# Patient Record
Sex: MALE | Race: BLACK OR AFRICAN AMERICAN | Employment: PART TIME | URBAN - METROPOLITAN AREA
[De-identification: names, ages, dates, MRNs, and addresses within clinical notes are randomized per-mention and may not be internally consistent; named-entity substitution may affect disease eponyms.]

---

## 2020-01-19 ENCOUNTER — HOSPITAL ENCOUNTER (EMERGENCY)
Age: 55
Discharge: HOME | End: 2020-01-20
Attending: EMERGENCY MEDICINE
Payer: COMMERCIAL

## 2020-01-19 DIAGNOSIS — G43.809 OTHER MIGRAINE WITHOUT STATUS MIGRAINOSUS, NOT INTRACTABLE: ICD-10-CM

## 2020-01-19 DIAGNOSIS — G47.411 PRIMARY NARCOLEPSY WITH CATAPLEXY: Primary | ICD-10-CM

## 2020-01-19 LAB
ANION GAP SERPL CALC-SCNC: 3 MMOL/L (ref 3–18)
BASOPHILS # BLD: 0 K/UL (ref 0–0.1)
BASOPHILS NFR BLD: 0 % (ref 0–2)
BUN SERPL-MCNC: 17 MG/DL (ref 7–18)
BUN/CREAT SERPL: 18 (ref 12–20)
CALCIUM SERPL-MCNC: 9 MG/DL (ref 8.5–10.1)
CHLORIDE SERPL-SCNC: 106 MMOL/L (ref 100–111)
CO2 SERPL-SCNC: 31 MMOL/L (ref 21–32)
CREAT SERPL-MCNC: 0.96 MG/DL (ref 0.6–1.3)
DIFFERENTIAL METHOD BLD: NORMAL
EOSINOPHIL # BLD: 0.3 K/UL (ref 0–0.4)
EOSINOPHIL NFR BLD: 4 % (ref 0–5)
ERYTHROCYTE [DISTWIDTH] IN BLOOD BY AUTOMATED COUNT: 14 % (ref 11.6–14.5)
GLUCOSE SERPL-MCNC: 120 MG/DL (ref 74–99)
HCT VFR BLD AUTO: 42.1 % (ref 36–48)
HGB BLD-MCNC: 14.5 G/DL (ref 13–16)
LYMPHOCYTES # BLD: 3.2 K/UL (ref 0.9–3.6)
LYMPHOCYTES NFR BLD: 44 % (ref 21–52)
MAGNESIUM SERPL-MCNC: 2 MG/DL (ref 1.6–2.6)
MCH RBC QN AUTO: 28.5 PG (ref 24–34)
MCHC RBC AUTO-ENTMCNC: 34.4 G/DL (ref 31–37)
MCV RBC AUTO: 82.9 FL (ref 74–97)
MONOCYTES # BLD: 0.5 K/UL (ref 0.05–1.2)
MONOCYTES NFR BLD: 6 % (ref 3–10)
NEUTS SEG # BLD: 3.4 K/UL (ref 1.8–8)
NEUTS SEG NFR BLD: 46 % (ref 40–73)
PLATELET # BLD AUTO: 269 K/UL (ref 135–420)
PMV BLD AUTO: 9.2 FL (ref 9.2–11.8)
POTASSIUM SERPL-SCNC: 3.9 MMOL/L (ref 3.5–5.5)
RBC # BLD AUTO: 5.08 M/UL (ref 4.7–5.5)
SODIUM SERPL-SCNC: 140 MMOL/L (ref 136–145)
WBC # BLD AUTO: 7.4 K/UL (ref 4.6–13.2)

## 2020-01-19 PROCEDURE — 85025 COMPLETE CBC W/AUTO DIFF WBC: CPT

## 2020-01-19 PROCEDURE — 80048 BASIC METABOLIC PNL TOTAL CA: CPT

## 2020-01-19 PROCEDURE — 83735 ASSAY OF MAGNESIUM: CPT

## 2020-01-19 PROCEDURE — 96375 TX/PRO/DX INJ NEW DRUG ADDON: CPT

## 2020-01-19 PROCEDURE — 74011250636 HC RX REV CODE- 250/636: Performed by: EMERGENCY MEDICINE

## 2020-01-19 PROCEDURE — 96374 THER/PROPH/DIAG INJ IV PUSH: CPT

## 2020-01-19 PROCEDURE — 99284 EMERGENCY DEPT VISIT MOD MDM: CPT

## 2020-01-19 RX ORDER — DIPHENHYDRAMINE HYDROCHLORIDE 50 MG/ML
50 INJECTION, SOLUTION INTRAMUSCULAR; INTRAVENOUS ONCE
Status: COMPLETED | OUTPATIENT
Start: 2020-01-19 | End: 2020-01-19

## 2020-01-19 RX ORDER — KETOROLAC TROMETHAMINE 15 MG/ML
15 INJECTION, SOLUTION INTRAMUSCULAR; INTRAVENOUS
Status: COMPLETED | OUTPATIENT
Start: 2020-01-19 | End: 2020-01-19

## 2020-01-19 RX ORDER — PROCHLORPERAZINE EDISYLATE 5 MG/ML
10 INJECTION INTRAMUSCULAR; INTRAVENOUS
Status: COMPLETED | OUTPATIENT
Start: 2020-01-19 | End: 2020-01-19

## 2020-01-19 RX ADMIN — PROCHLORPERAZINE EDISYLATE 10 MG: 5 INJECTION INTRAMUSCULAR; INTRAVENOUS at 22:58

## 2020-01-19 RX ADMIN — SODIUM CHLORIDE 1000 ML: 900 INJECTION, SOLUTION INTRAVENOUS at 23:09

## 2020-01-19 RX ADMIN — KETOROLAC TROMETHAMINE 15 MG: 15 INJECTION, SOLUTION INTRAMUSCULAR; INTRAVENOUS at 22:58

## 2020-01-19 RX ADMIN — DIPHENHYDRAMINE HYDROCHLORIDE 50 MG: 50 INJECTION INTRAMUSCULAR; INTRAVENOUS at 22:58

## 2020-01-20 VITALS
OXYGEN SATURATION: 99 % | RESPIRATION RATE: 15 BRPM | HEART RATE: 71 BPM | SYSTOLIC BLOOD PRESSURE: 129 MMHG | DIASTOLIC BLOOD PRESSURE: 85 MMHG | TEMPERATURE: 98.3 F

## 2020-01-20 NOTE — ED PROVIDER NOTES
Dominic Lagunas is a 48 y.o. male with a history of hypertension, sleep apnea, migraines, narcolepsy with cataplectic spells coming in complaining of weakness. Patient states that he had a spell consistent with his previous narcoleptic spells. He states he was at home shortly before arrival and felt extremely weak and \"frozen. \"  He states that he was still awake and could hear, but was just too weak to move. Denies any falls or trauma. He states that this lasted for a few minutes before resolving. He states now he feels just very fatigued and tired. He states that he is also now having a migraine. He states that he has a constant, achy, pressure-like headache across the front of his head. He reports associated photophobia and phonophobia. He states that he frequently gets migraines after his cataplectic spells. He denies any fevers or chills. Denies any vomiting. He states he is otherwise been feeling well recently, however he has had more of these narcoleptic spells over the last few weeks. He states he sees [facility] and has been following up and he is scheduled for sleep study. No other complaints at this time. No past medical history on file. No past surgical history on file. No family history on file.     Social History     Socioeconomic History    Marital status:      Spouse name: Not on file    Number of children: Not on file    Years of education: Not on file    Highest education level: Not on file   Occupational History    Not on file   Social Needs    Financial resource strain: Not on file    Food insecurity:     Worry: Not on file     Inability: Not on file    Transportation needs:     Medical: Not on file     Non-medical: Not on file   Tobacco Use    Smoking status: Not on file   Substance and Sexual Activity    Alcohol use: Not on file    Drug use: Not on file    Sexual activity: Not on file   Lifestyle    Physical activity:     Days per week: Not on file     Minutes per session: Not on file    Stress: Not on file   Relationships    Social connections:     Talks on phone: Not on file     Gets together: Not on file     Attends Church service: Not on file     Active member of club or organization: Not on file     Attends meetings of clubs or organizations: Not on file     Relationship status: Not on file    Intimate partner violence:     Fear of current or ex partner: Not on file     Emotionally abused: Not on file     Physically abused: Not on file     Forced sexual activity: Not on file   Other Topics Concern    Not on file   Social History Narrative    Not on file         ALLERGIES: Patient has no allergy information on record. Review of Systems   Constitutional: Negative. Negative for chills and fever. Eyes: Positive for photophobia. Negative for visual disturbance. Respiratory: Negative. Negative for shortness of breath. Cardiovascular: Negative. Negative for chest pain. Gastrointestinal: Negative. Negative for abdominal pain, nausea and vomiting. Musculoskeletal: Negative. Negative for myalgias. Skin: Negative. Negative for rash. Neurological: Positive for weakness and headaches. Negative for dizziness, syncope, light-headedness and numbness. All other systems reviewed and are negative. Vitals:    01/19/20 2215 01/19/20 2245 01/19/20 2300   BP: (!) 143/96 126/88 126/86   Pulse:  71    Resp:  15    Temp:  98.3 °F (36.8 °C)    SpO2: 99% 98% 98%            Physical Exam  Vitals signs reviewed. Constitutional:       General: He is not in acute distress. Appearance: Normal appearance. He is well-developed. HENT:      Head: Normocephalic and atraumatic. Eyes:      Extraocular Movements: Extraocular movements intact. Pupils: Pupils are equal, round, and reactive to light. Comments: Mild photophobia on exam   Neck:      Musculoskeletal: Normal range of motion and neck supple.       Comments: No rigidity, full range of motion. No meningeal signs. Cardiovascular:      Rate and Rhythm: Normal rate and regular rhythm. Heart sounds: S1 normal and S2 normal.   Pulmonary:      Effort: Pulmonary effort is normal. No accessory muscle usage or respiratory distress. Abdominal:      General: There is no distension. Palpations: Abdomen is not rigid. Tenderness: There is no tenderness. Musculoskeletal: Normal range of motion. General: No tenderness. Skin:     General: Skin is warm. Findings: No rash. Neurological:      General: No focal deficit present. Mental Status: He is alert and oriented to person, place, and time. Sensory: No sensory deficit. Motor: No weakness. Coordination: Coordination normal.      Comments: No current muscular weakness. Patient moving all extremities. No focal deficits. Psychiatric:         Speech: Speech normal.          MDM  Number of Diagnoses or Management Options  Other migraine without status migrainosus, not intractable:   Primary narcolepsy with cataplexy:   Diagnosis management comments: Maira Moody is a 48 y.o. male with a history of narcolepsy coming in after some type of narcoleptic spell. Symptoms have since resolved. No focal deficits or weakness. Patient did not syncopize or lose consciousness. He states he was awake and could hear everything going on throughout this entire spell. Patient now having a migraine, consistent with previous spells. No focal deficits or concern for subarachnoid hemorrhage or intracranial infection. Will check basic labs including potassium. We will treat patient's migraine symptomatically. Do not feel he needs advanced brain imaging at this time as this is consistent with his previous spells. Will advise to follow-up with his PCP as an outpatient for further testing and work-up. Patient feeling much better after the typical migraine cocktail here.   Is resting comfortably in bed.  Easily arousable and answers questions appropriately. Advised him to follow-up with his regular doctor and gave return precautions for worsening symptoms. Procedures        Vitals:  Patient Vitals for the past 12 hrs:   Temp Pulse Resp BP SpO2   01/19/20 2300 -- -- -- 126/86 98 %   01/19/20 2245 98.3 °F (36.8 °C) 71 15 126/88 98 %   01/19/20 2215 -- -- -- (!) 143/96 99 %       Medications ordered:   Medications   sodium chloride 0.9 % bolus infusion 1,000 mL (1,000 mL IntraVENous New Bag 1/19/20 2309)   prochlorperazine (COMPAZINE) injection 10 mg (10 mg IntraVENous Given 1/19/20 2258)   ketorolac (TORADOL) injection 15 mg (15 mg IntraVENous Given 1/19/20 2258)   diphenhydrAMINE (BENADRYL) injection 50 mg (50 mg IntraVENous Given 1/19/20 2258)         Lab findings:  Recent Results (from the past 12 hour(s))   CBC WITH AUTOMATED DIFF    Collection Time: 01/19/20 10:39 PM   Result Value Ref Range    WBC 7.4 4.6 - 13.2 K/uL    RBC 5.08 4.70 - 5.50 M/uL    HGB 14.5 13.0 - 16.0 g/dL    HCT 42.1 36.0 - 48.0 %    MCV 82.9 74.0 - 97.0 FL    MCH 28.5 24.0 - 34.0 PG    MCHC 34.4 31.0 - 37.0 g/dL    RDW 14.0 11.6 - 14.5 %    PLATELET 067 954 - 031 K/uL    MPV 9.2 9.2 - 11.8 FL    NEUTROPHILS 46 40 - 73 %    LYMPHOCYTES 44 21 - 52 %    MONOCYTES 6 3 - 10 %    EOSINOPHILS 4 0 - 5 %    BASOPHILS 0 0 - 2 %    ABS. NEUTROPHILS 3.4 1.8 - 8.0 K/UL    ABS. LYMPHOCYTES 3.2 0.9 - 3.6 K/UL    ABS. MONOCYTES 0.5 0.05 - 1.2 K/UL    ABS. EOSINOPHILS 0.3 0.0 - 0.4 K/UL    ABS.  BASOPHILS 0.0 0.0 - 0.1 K/UL    DF AUTOMATED     METABOLIC PANEL, BASIC    Collection Time: 01/19/20 10:39 PM   Result Value Ref Range    Sodium 140 136 - 145 mmol/L    Potassium 3.9 3.5 - 5.5 mmol/L    Chloride 106 100 - 111 mmol/L    CO2 31 21 - 32 mmol/L    Anion gap 3 3.0 - 18 mmol/L    Glucose 120 (H) 74 - 99 mg/dL    BUN 17 7.0 - 18 MG/DL    Creatinine 0.96 0.6 - 1.3 MG/DL    BUN/Creatinine ratio 18 12 - 20      GFR est AA >60 >60 ml/min/1.73m2 GFR est non-AA >60 >60 ml/min/1.73m2    Calcium 9.0 8.5 - 10.1 MG/DL   MAGNESIUM    Collection Time: 01/19/20 10:39 PM   Result Value Ref Range    Magnesium 2.0 1.6 - 2.6 mg/dL       Disposition:  Diagnosis:   1. Primary narcolepsy with cataplexy    2.  Other migraine without status migrainosus, not intractable        Disposition: Discharge    Follow-up Information     Follow up With Specialties Details Why Contact Info    Melinda Locke MD [facility] Schedule an appointment as soon as possible for a visit for office follow up [address]ron Lee [address]      [facility] EMERGENCY DEPT Emergency Medicine  As needed, If symptoms worsen [address] [place] [address] [id number] [phone]           Patient's Medications    No medications on file

## 2020-01-20 NOTE — DISCHARGE INSTRUCTIONS
Patient Education        Narcolepsy: Care Instructions  Your Care Instructions  Everybody gets a little sleepy once in a while, during a long car ride or other times when you want to be alert. But some people cannot control their sleepiness. It is no fun to be in the middle of your workday or driving your car down the street and have an overwhelming desire to sleep. This condition is called narcolepsy. Doctors do not know what causes narcolepsy. Your doctor may ask you to keep a sleep diary for a couple of weeks. It will help you and your doctor decide on treatment. It often helps to take limited naps during the day. And these things might help you sleep better at night: create a good place to sleep, do things that help your mood before you go to bed, and keep a consistent sleep schedule. Your doctor may recommend medicine to help you stay awake during the day or sleep at night. Follow-up care is a key part of your treatment and safety. Be sure to make and go to all appointments, and call your doctor if you are having problems. It's also a good idea to know your test results and keep a list of the medicines you take. How can you care for yourself at home? · Try to take 2 or 3 short naps at regular times during the day. After a nap, always give yourself time to become alert before you drive a car or do anything that might cause an accident. · Take your medicines exactly as prescribed. Call your doctor if you think you are having a problem with your medicine. You may need to try several medicines before you find the one that works best for you. · Try to improve your nighttime sleep habits. Here are a few of the things you could do:  ? Go to bed only when you are sleepy, and get up at the same time every day, even if you do not feel rested.  This might help you sleep well the next night and the night after that.  ? If you lie awake for longer than 15 minutes, get up, leave the bedroom, and do something quiet, such as read, until you feel sleepy again. ? Avoid drinking or eating anything with caffeine after 3 p.m. This includes coffee, tea, cola drinks, and chocolate. ? Make sure your bedroom is not too hot or too cold, and keep it quiet and dark. ? Make sure your mattress provides good support. · Be kind to your body:  ? Relieve tension with exercise or a massage. ? Learn and do relaxation techniques. ? Avoid alcohol, caffeine, nicotine, and illegal drugs. They can increase your anxiety level and cause sleep problems. · Get light exercise daily. Gentle stretching, light aerobics, swimming, walking, and riding a bicycle can help to keep you going during the day and to sleep well at night. · Eat a healthy diet. You may feel better if you avoid heavy meals and eat more fruits and vegetables. · Do not use over-the-counter sleeping pills. They can make your sleep restless. · Ask your doctor if any medicines you take could cause sleepiness. For example, cold and allergy medicines can make you drowsy. · Consider joining a support group with people who have narcolepsy or other sleep problems. These groups can be a good source of tips for what to do. Also, it can be comforting to talk to people who face similar challenges. Your doctor can tell you how to contact a support group. When should you call for help? Call your doctor now or seek immediate medical care if:    · You passed out (lost consciousness).     · You cannot use your muscles. This may happen very briefly, sometimes after you laugh or are angry, and may only affect part of your body.    Watch closely for changes in your health, and be sure to contact your doctor if:    · Your sleepiness continues to get worse. Where can you learn more? Go to http://kezia-olivia.info/. Enter E410 in the search box to learn more about \"Narcolepsy: Care Instructions. \"  Current as of: June 9, 2019  Content Version: 12.2  © 8195-1301 [employer]. Care instructions adapted under license by [employer] (which disclaims liability or warranty for this information). If you have questions about a medical condition or this instruction, always ask your healthcare professional. [address] any warranty or liability for your use of this information. Patient Education        Migraine Headache: Care Instructions  Your Care Instructions  Migraines are painful, throbbing headaches that often start on one side of the head. They may cause nausea and vomiting and make you sensitive to light, sound, or smell. Without treatment, migraines can last from 4 hours to a few days. Medicines can help prevent migraines or stop them after they have started. Your doctor can help you find which ones work best for you. Follow-up care is a key part of your treatment and safety. Be sure to make and go to all appointments, and call your doctor if you are having problems. It's also a good idea to know your test results and keep a list of the medicines you take. How can you care for yourself at home? · Do not drive if you have taken a prescription pain medicine. · Rest in a quiet, dark room until your headache is gone. Close your eyes, and try to relax or go to sleep. Don't watch TV or read. · Put a cold, moist cloth or cold pack on the painful area for 10 to 20 minutes at a time. Put a thin cloth between the cold pack and your skin. · Use a warm, moist towel or a heating pad set on low to relax tight shoulder and neck muscles. · Have someone gently massage your neck and shoulders. · Take your medicines exactly as prescribed. Call your doctor if you think you are having a problem with your medicine. You will get more details on the specific medicines your doctor prescribes. · Be careful not to take pain medicine more often than the instructions allow. You could get worse or more frequent headaches when the medicine wears off.   To prevent migraines  · Keep a headache diary so you can figure out what triggers your headaches. Avoiding triggers may help you prevent headaches. Record when each headache began, how long it lasted, and what the pain was like. (Was it throbbing, aching, stabbing, or dull?) Write down any other symptoms you had with the headache, such as nausea, flashing lights or dark spots, or sensitivity to bright light or loud noise. Note if the headache occurred near your period. List anything that might have triggered the headache. Triggers may include certain foods (chocolate, cheese, wine) or odors, smoke, bright light, stress, or lack of sleep. · If your doctor has prescribed medicine for your migraines, take it as directed. You may have medicine that you take only when you get a migraine and medicine that you take all the time to help prevent migraines. ? If your doctor has prescribed medicine for when you get a headache, take it at the first sign of a migraine, unless your doctor has given you other instructions. ? If your doctor has prescribed medicine to prevent migraines, take it exactly as prescribed. Call your doctor if you think you are having a problem with your medicine. · Find healthy ways to deal with stress. Migraines are most common during or right after stressful times. Take time to relax before and after you do something that has caused a migraine in the past.  · Try to keep your muscles relaxed by keeping good posture. Check your jaw, face, neck, and shoulder muscles for tension. Try to relax them. When you sit at a desk, change positions often. And make sure to stretch for 30 seconds each hour. · Get plenty of sleep and exercise. · Eat meals on a regular schedule. Avoid foods and drinks that often trigger migraines.  These include chocolate, alcohol (especially red wine and port), aspartame, monosodium glutamate (MSG), and some additives found in foods (such as hot dogs, bray, cold cuts, aged cheeses, and pickled foods). · Limit caffeine. Don't drink too much coffee, tea, or soda. But don't quit caffeine suddenly. That can also give you migraines. · Do not smoke or allow others to smoke around you. If you need help quitting, talk to your doctor about stop-smoking programs and medicines. These can increase your chances of quitting for good. · If you are taking birth control pills or hormone therapy, talk to your doctor about whether they are triggering your migraines. When should you call for help? Call 911 anytime you think you may need emergency care. For example, call if:    · You have signs of a stroke. These may include:  ? Sudden numbness, paralysis, or weakness in your face, arm, or leg, especially on only one side of your body. ? Sudden vision changes. ? Sudden trouble speaking. ? Sudden confusion or trouble understanding simple statements. ? Sudden problems with walking or balance. ? A sudden, severe headache that is different from past headaches.    Call your doctor now or seek immediate medical care if:    · You have new or worse nausea and vomiting.     · You have a new or higher fever.     · Your headache gets much worse.    Watch closely for changes in your health, and be sure to contact your doctor if:    · You are not getting better after 2 days (48 hours). Where can you learn more? Go to http://kezia-olivia.info/. Enter O265 in the search box to learn more about \"Migraine Headache: Care Instructions. \"  Current as of: March 28, 2019  Content Version: 12.2  © 6940-5902 [employer]. Care instructions adapted under license by [employer] (which disclaims liability or warranty for this information). If you have questions about a medical condition or this instruction, always ask your healthcare professional. [address] any warranty or liability for your use of this information.

## 2020-01-20 NOTE — ED TRIAGE NOTES
Patient presents to the ED with complaints of cataplexy episode with a migraine. Patient states he has a history of narcolepsy. Patient denies any chest pain, nausea, diarrhea, vomiting. Patient states he just feels tired and has a migraine.

## 2020-05-16 ENCOUNTER — HOSPITAL ENCOUNTER (EMERGENCY)
Age: 55
Discharge: HOME | End: 2020-05-16
Attending: EMERGENCY MEDICINE
Payer: OTHER GOVERNMENT

## 2020-05-16 VITALS
DIASTOLIC BLOOD PRESSURE: 93 MMHG | WEIGHT: 275 LBS | SYSTOLIC BLOOD PRESSURE: 141 MMHG | RESPIRATION RATE: 18 BRPM | OXYGEN SATURATION: 100 % | HEIGHT: 72 IN | TEMPERATURE: 98.3 F | BODY MASS INDEX: 37.25 KG/M2 | HEART RATE: 62 BPM

## 2020-05-16 DIAGNOSIS — J06.9 ACUTE URI: Primary | ICD-10-CM

## 2020-05-16 LAB — DEPRECATED S PYO AG THROAT QL EIA: NEGATIVE

## 2020-05-16 PROCEDURE — 87070 CULTURE OTHR SPECIMN AEROBIC: CPT

## 2020-05-16 PROCEDURE — 99282 EMERGENCY DEPT VISIT SF MDM: CPT

## 2020-05-16 PROCEDURE — 87880 STREP A ASSAY W/OPTIC: CPT

## 2020-05-16 PROCEDURE — 87147 CULTURE TYPE IMMUNOLOGIC: CPT

## 2020-05-16 RX ORDER — BUPROPION HYDROCHLORIDE 100 MG/1
100 TABLET, EXTENDED RELEASE ORAL DAILY
COMMUNITY

## 2020-05-16 RX ORDER — ALBUTEROL SULFATE 90 UG/1
AEROSOL, METERED RESPIRATORY (INHALATION)
COMMUNITY

## 2020-05-16 RX ORDER — DULOXETIN HYDROCHLORIDE 30 MG/1
30 CAPSULE, DELAYED RELEASE ORAL 2 TIMES DAILY
COMMUNITY

## 2020-05-16 RX ORDER — METFORMIN HYDROCHLORIDE 1000 MG/1
1000 TABLET ORAL 2 TIMES DAILY WITH MEALS
COMMUNITY

## 2020-05-16 NOTE — ED PROVIDER NOTES
HPI   Patient complains of stuffy nose and sinus congestion for the past 4 to 5 days. He is been taking over-the-counter nasal decongestants. He complains of a 24-hour history of a scratchy throat and sore throat. He said yesterday his throat was feeling scratchy and today it is hurting more specially hurts to swallow. He saw a physician at a local facility earlier this morning and they diagnosed him with an upper respiratory infection and told him to follow-up if symptoms worsen. He says his throat feels worse now and this prompted him to come the emergency room today. He denies any fever chills. Denies any chest pain or abdominal pain. No other complaints given at this time. Past Medical History:   Diagnosis Date    [facility] ([facility] [address]     Bronchitis, chronic (HCC)     Diabetes ([facility] [address]        Past Surgical History:   Procedure Laterality Date    HX HEMORRHOIDECTOMY           History reviewed. No pertinent family history. Social History     Socioeconomic History    Marital status:      Spouse name: Not on file    Number of children: Not on file    Years of education: Not on file    Highest education level: Not on file   Occupational History    Not on file   Social Needs    Financial resource strain: Not on file    Food insecurity     Worry: Not on file     Inability: Not on file    Transportation needs     Medical: Not on file     Non-medical: Not on file   Tobacco Use    Smoking status: Never Smoker    Smokeless tobacco: Never Used   Substance and Sexual Activity    Alcohol use:  Yes    Drug use: Not Currently    Sexual activity: Not on file   Lifestyle    Physical activity     Days per week: Not on file     Minutes per session: Not on file    Stress: Not on file   Relationships    Social connections     Talks on phone: Not on file     Gets together: Not on file     Attends Sabianist service: Not on file     Active member of club or organization: Not on file     Attends meetings of clubs or organizations: Not on file     Relationship status: Not on file    Intimate partner violence     Fear of current or ex partner: Not on file     Emotionally abused: Not on file     Physically abused: Not on file     Forced sexual activity: Not on file   Other Topics Concern    Not on file   Social History Narrative    Not on file         ALLERGIES: Patient has no known allergies. Review of Systems   Constitutional: Negative. HENT: Positive for congestion, rhinorrhea and sore throat. Eyes: Negative. Respiratory: Negative. Cardiovascular: Negative. Gastrointestinal: Negative. Genitourinary: Negative. Musculoskeletal: Negative. Neurological: Negative. Psychiatric/Behavioral: Negative. Vitals:    05/16/20 1149   BP: (!) 141/93   Pulse: 62   Resp: 18   Temp: 98.3 °F (36.8 °C)   SpO2: 100%   Weight: 124.7 kg (275 lb)   Height: 6' (1.829 m)            Physical Exam  Vitals signs and nursing note reviewed. Constitutional:       Appearance: He is well-developed. HENT:      Head: Normocephalic and atraumatic. Eyes:      Pupils: Pupils are equal, round, and reactive to light. Neck:      Musculoskeletal: Neck supple. Cardiovascular:      Rate and Rhythm: Normal rate and regular rhythm. Pulmonary:      Effort: Pulmonary effort is normal.      Breath sounds: Normal breath sounds. Abdominal:      Palpations: Abdomen is soft. Musculoskeletal: Normal range of motion. Skin:     General: Skin is warm and dry. Neurological:      Mental Status: He is alert and oriented to person, place, and time. [facility]         Procedures        Reviewed the results of the ER evaluation and his throat test with the patient. He understands and agrees with the disposition follow-up plan.   Cristina Rice MD 12:36 PM

## 2020-05-16 NOTE — DISCHARGE INSTRUCTIONS
Patient Education        Upper Respiratory Infection (Cold): Care Instructions  Your Care Instructions    An upper respiratory infection, or URI, is an infection of the nose, sinuses, or throat. URIs are spread by coughs, sneezes, and direct contact. The common cold is the most frequent kind of URI. The flu and sinus infections are other kinds of URIs. Almost all URIs are caused by viruses. Antibiotics won't cure them. But you can treat most infections with home care. This may include drinking lots of fluids and taking over-the-counter pain medicine. You will probably feel better in 4 to 10 days. The doctor has checked you carefully, but problems can develop later. If you notice any problems or new symptoms, get medical treatment right away. Follow-up care is a key part of your treatment and safety. Be sure to make and go to all appointments, and call your doctor if you are having problems. It's also a good idea to know your test results and keep a list of the medicines you take. How can you care for yourself at home? · To prevent dehydration, drink plenty of fluids, enough so that your urine is light yellow or clear like water. Choose water and other caffeine-free clear liquids until you feel better. If you have kidney, heart, or liver disease and have to limit fluids, talk with your doctor before you increase the amount of fluids you drink. · Take an over-the-counter pain medicine, such as acetaminophen (Tylenol), ibuprofen (Advil, Motrin), or naproxen (Aleve). Read and follow all instructions on the label. · Before you use cough and cold medicines, check the label. These medicines may not be safe for young children or for people with certain health problems. · Be careful when taking over-the-counter cold or flu medicines and Tylenol at the same time. Many of these medicines have acetaminophen, which is Tylenol. Read the labels to make sure that you are not taking more than the recommended dose.  Too much acetaminophen (Tylenol) can be harmful. · Get plenty of rest.  · Do not smoke or allow others to smoke around you. If you need help quitting, talk to your doctor about stop-smoking programs and medicines. These can increase your chances of quitting for good. When should you call for help? Call 911 anytime you think you may need emergency care. For example, call if:    · You have severe trouble breathing.    Call your doctor now or seek immediate medical care if:    · You seem to be getting much sicker.     · You have new or worse trouble breathing.     · You have a new or higher fever.     · You have a new rash.    Watch closely for changes in your health, and be sure to contact your doctor if:    · You have a new symptom, such as a sore throat, an earache, or sinus pain.     · You cough more deeply or more often, especially if you notice more mucus or a change in the color of your mucus.     · You do not get better as expected. Where can you learn more? Go to http://kezia-olivia.info/  Enter K520 in the search box to learn more about \"Upper Respiratory Infection (Cold): Care Instructions. \"  Current as of: June 9, 2019Content Version: 12.4  © 3773-0713 [employer]orporated. Care instructions adapted under license by [employer] (which disclaims liability or warranty for this information). If you have questions about a medical condition or this instruction, always ask your healthcare professional. [address] any warranty or liability for your use of this information.

## 2020-05-16 NOTE — ED TRIAGE NOTES
Pt states onset of dry cough this week. Awoke during the night last night with sore throat. Went to ED at [facility] [place] @ 3am and was advised he has post nasal drip and to continue to take Zyrtec as he has been. States today he feels worse and has been nauseated. Denies vomiting.

## 2020-05-20 LAB
BACTERIA SPEC CULT: ABNORMAL
BACTERIA SPEC CULT: ABNORMAL
SERVICE CMNT-IMP: ABNORMAL

## 2022-09-20 ENCOUNTER — HOSPITAL ENCOUNTER (OUTPATIENT)
Dept: PHYSICAL THERAPY | Age: 57
Discharge: HOME | End: 2022-09-20
Payer: OTHER GOVERNMENT

## 2022-09-20 PROCEDURE — 97110 THERAPEUTIC EXERCISES: CPT

## 2022-09-20 PROCEDURE — 97162 PT EVAL MOD COMPLEX 30 MIN: CPT

## 2022-09-20 PROCEDURE — 97112 NEUROMUSCULAR REEDUCATION: CPT

## 2022-09-20 PROCEDURE — 97535 SELF CARE MNGMENT TRAINING: CPT

## 2022-09-20 NOTE — PROGRESS NOTES
PT DAILY TREATMENT NOTE     Patient Name: Oralia Thompson  Date:2022  : 1965  [x]  Patient  Verified  Payor: Marguerite Allen / Plan: [facility] / Product Type: Federal Funded Programs /    In [employer] time:1245pm  Total Treatment Time (min): 40  Visit #: 1 of 8     Treatment Area: Other low back pain [M54.59]    SUBJECTIVE  Pain Level (0-10 scale): 6  Any medication changes, allergies to medications, adverse drug reactions, diagnosis change, or new procedure performed?: [x] No    [] Yes (see summary sheet for update)  Subjective functional status/changes:   [] No changes reported  See eval    OBJECTIVE     16 min [x]Eval                  []Re-Eval       8 min Therapeutic Exercise:  [] See flow sheet : HEP education   Rationale: increase ROM and increase strength to improve the patients ability to manage ADLs with reduced pain. 8 min Neuromuscular Re-education:  []  See flow sheet : Pt education and performed of self SIJ correction MET in sitting followed by explanation of performance in supine with picture. Rationale: improve coordination and increase proprioception  to improve the patients ability to manage ambulatory ADLs with improved stability and reduced pain. 8 min Self Care/Home Management: Pt education regarding relevant anatomy and pathology as it relates to self care. Rationale: increase ROM and reduce pain   to improve the patients ability to manage self care.           With   [] TE   [] TA   [] neuro   [] other: Patient Education: [x] Review HEP    [] Progressed/Changed HEP based on:   [] positioning   [] body mechanics   [] transfers   [] heat/ice application    [] other:      Other Objective/Functional Measures: see eval     Pain Level (0-10 scale) post treatment: 6    ASSESSMENT/Changes in Function: see POC    Patient will continue to benefit from skilled PT services to modify and progress therapeutic interventions, address functional mobility deficits, address ROM deficits, address strength deficits, analyze and address soft tissue restrictions, analyze and cue movement patterns, analyze and modify body mechanics/ergonomics, assess and modify postural abnormalities, address imbalance/dizziness, and instruct in home and community integration to attain remaining goals. [x]  See Plan of Care  []  See progress note/recertification  []  See Discharge Summary         Progress towards goals / Updated goals:  Short Term Goals: To be accomplished in 2 weeks:  Pt will report daily HEP compliance to optimize therapeutic outcomes. Eval: HEP assigned  Pt will report pain < or = 3/10 to demonstrate improved self management of pain. Eval: 6/10 today  Long Term Goals: To be accomplished in 4 weeks:  Pt will improve his FOTO to 57, demonstrating improved functional ADL capacity. Eval: 48  Pt will demonstrate WNL pelvic alignment to reduce pain with exercise. Eval: left posterior innominate  Pt will improve his B gluteus sae strength to 4+/5 MMT to improve hip stability with weight lifting. Eval: 4-/5  Pt will improve his B gluteus medius strength to 4+/5 MMT to reduce pain with ambulation. Eval: 4-/5  Pt will demonstrate squat and dead lift with proper form to prepare for return to lifting at a gym.    Eval: NT    PLAN  []  Upgrade activities as tolerated     [x]  Continue plan of care  []  Update interventions per flow sheet       []  Discharge due to:_  []  Other:_      Jose Breath, PT 9/20/2022  2:09 PM    Future Appointments   Date Time Provider Heike Calvert   9/22/2022 12:00 PM Leah Marion, PT [facility]   9/27/2022  1:30 PM Laban Bombard, PTA [facility]   9/30/2022 12:45 PM Laban Bombard, PTA [facility]   10/4/2022 12:45 PM Laban Bombard, PTA [facility]   10/6/2022 12:00 PM Leah Mraion, PT [facility]   10/11/2022  1:30 PM Chula Girt, PT [facility]   10/13/2022  1:45 PM Chula Girt, PT [facility]

## 2022-09-20 NOTE — PROGRESS NOTES
[facility]  [address] [facility] [address]  [place], [address]  [phone] fax    Plan of Care/ Statement of Necessity for Physical Therapy Services    Patient name: Leyla Castillo Start of Care: 2022   Referral source: Deniz Bass* : 1965    Medical Diagnosis: Other low back pain [M54.59]  Payor: [facility] CCN / Plan: [id number] / Product Type: Federal Funded Programs /  Onset [id number]    Treatment Diagnosis: LBP, left hip pain   Prior Hospitalization: see medical history Provider#: [id number]   Medications: Verified on Patient summary List    Comorbidities: asthma, back pain, BMI > 30, depression, diabetes, HA, high blood pressure, sleep dysfunction, chronic B knee pains   Prior Level of Function: prior to the last year has been managing pain with NSAIDS and oral meds. The Plan of Care and following information is based on the information from the initial evaluation. Assessment/ key information: Pt is a 57-year-old man presenting to the clinic with hx of LBP left > right beginning in 2018 following slipping on ice and falling onto his buttocks. Pain has worsened over the last year in his low back and into his left posterolateral hip. He is limited to < 30 minutes with walking due to pain and also experiences increased pain with prolonged sitting. He has been unable to lift weights, ride his bike, or exercise without pain increase. Denies any numbness, tingling, burning. Demonstrates (+) left innominate posterior rotation, (+) left NOEL, tight left QL, and TTP of L1-5. Impairments include reduced quadriceps and piri flexibility, decreased B hip IR, decreased B hip strength, decreased abdominal strength, and pain. 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 Pt will benefit from skilled PT services to address the aforementioned impairments and improve ease of ADLs.      Evaluation Complexity History MEDIUM  Complexity : 1-2 comorbidities / personal factors will impact the outcome/ POC ; Examination MEDIUM Complexity : 3 Standardized tests and measures addressing body structure, function, activity limitation and / or participation in recreation  ;Presentation MEDIUM Complexity : Evolving with changing characteristics  ; Clinical Decision Making MEDIUM Complexity : FOTO score of 26-74  Overall Complexity Rating: MEDIUM  Problem List: pain affecting function, decrease ROM, decrease strength, impaired gait/ balance, decrease ADL/ functional abilitiies, decrease activity tolerance, decrease flexibility/ joint mobility, and decrease transfer abilities   Treatment Plan may include any combination of the following: Therapeutic exercise, Therapeutic activities, Neuromuscular re-education, Physical agent/modality, Gait/balance training, Manual therapy, Patient education, Self Care training, Functional mobility training, Home safety training, and Stair training  Patient / Family readiness to learn indicated by: asking questions, trying to perform skills, and interest  Persons(s) to be included in education: patient (P)  Barriers to Learning/Limitations: None  Patient Goal (s): dissipate the pain to be active, I.e. exercise  Patient Self Reported Health Status: poor  Rehabilitation Potential: good    Short Term Goals: To be accomplished in 2 weeks:  Pt will report daily HEP compliance to optimize therapeutic outcomes. Pt will report pain < or = 3/10 to demonstrate improved self management of pain. Long Term Goals: To be accomplished in 4 weeks:  Pt will improve his FOTO to 57, demonstrating improved functional ADL capacity. Pt will demonstrate WNL pelvic alignment to reduce pain with exercise. Pt will improve his B gluteus sae strength to 4+/5 MMT to improve hip stability with weight lifting. Pt will improve his B gluteus medius strength to 4+/5 MMT to reduce pain with ambulation.   Pt will demonstrate squat and dead lift with proper form to prepare for return to lifting at a gym. Frequency / Duration: Patient to be seen 2 times per week for 4 weeks. Patient/ Caregiver education and instruction: Diagnosis, prognosis, self care, activity modification, and exercises   [x]  Plan of care has been reviewed with JUNIOR Bass, PT 9/20/2022 1:45 PM    ________________________________________________________________________    I certify that the above Therapy Services are being furnished while the patient is under my care. I agree with the treatment plan and certify that this therapy is necessary.     [de-identified] Signature:____________Date:_________TIME:________     Cassidy Walden*  ** Signature, Date and Time must be completed for valid certification **    Please sign and return to [facility] [address] [place] [address] & [facility]  [address]  [place], [address]  [phone] fax

## 2022-09-22 ENCOUNTER — HOSPITAL ENCOUNTER (OUTPATIENT)
Dept: PHYSICAL THERAPY | Age: 57
Discharge: HOME | End: 2022-09-22
Payer: OTHER GOVERNMENT

## 2022-09-22 PROCEDURE — 97110 THERAPEUTIC EXERCISES: CPT

## 2022-09-22 PROCEDURE — 97140 MANUAL THERAPY 1/> REGIONS: CPT

## 2022-09-22 PROCEDURE — 97112 NEUROMUSCULAR REEDUCATION: CPT

## 2022-09-22 NOTE — PROGRESS NOTES
PT DAILY TREATMENT NOTE 10-18    Patient Name: Jaime Domingo  Date:2022  : 1965  [x]  Patient  Verified  Payor: [facility] CCN / Plan: [facility] CCN / Product Type: Federal Funded Programs /    In [employer] time:1246  Total Treatment Time (min): 43  Visit #: 2 of 8        Treatment Area: Other low back pain [M54.59]    SUBJECTIVE  Pain Level (0-10 scale): 0  Any medication changes, allergies to medications, adverse drug reactions, diagnosis change, or new procedure performed?: [x] No    [] Yes (see summary sheet for update)  Subjective functional status/changes:   [] No changes reported  Reports he hasn't tried any of his HEP due to being sore after the IE. Reports no pain today. Brought in MRI resutls. OBJECTIVE    12 min Therapeutic Exercise:  [x] See flow sheet :   Rationale: increase ROM and increase strength to improve the patients ability to form daily activities      23 min Neuromuscular Re-education:  [x]  See flow sheet :   Rationale: increase ROM, increase strength, improve balance, and increase proprioception  to improve the patients ability to recruit TA and glutes for daily activities     8 min Manual Therapy:  SI check   The manual therapy interventions were performed at a separate and distinct time from the therapeutic activities interventions. Rationale: increase postural awareness to ensure neutral pelvis  With   [] TE   [] TA   [] neuro   [] other: Patient Education: [x] Review HEP    [] Progressed/Changed HEP based on:   [] positioning   [] body mechanics   [] transfers   [] heat/ice application    [] other:      Other Objective/Functional Measures:      Pain Level (0-10 scale) post treatment: 0    ASSESSMENT/Changes in Function: MRI was unremarkable and showed mild degenerative changes which are consistent with patient's age. Held estim and MH 2/2 no pain. Patient does have his own TENS that he currently uses at home.  Required some cueing for TA recruitment and to avoid flexing at hips at trap bar. Patient will continue to benefit from skilled PT services to modify and progress therapeutic interventions, address functional mobility deficits, address strength deficits, and analyze and cue movement patterns to attain remaining goals. []  See Plan of Care  []  See progress note/recertification  []  See Discharge Summary         Progress towards goals / Updated goals:  Short Term Goals: To be accomplished in 2 weeks:  Pt will report daily HEP compliance to optimize therapeutic outcomes. Eval: HEP assigned  Current: has not attempted 9/22/22  Pt will report pain < or = 3/10 to demonstrate improved self management of pain. Eval: 6/10 today  Long Term Goals: To be accomplished in 4 weeks:  Pt will improve his FOTO to 57, demonstrating improved functional ADL capacity. Eval: 48  Pt will demonstrate WNL pelvic alignment to reduce pain with exercise. Eval: left posterior innominate  Pt will improve his B gluteus sae strength to 4+/5 MMT to improve hip stability with weight lifting. Eval: 4-/5  Pt will improve his B gluteus medius strength to 4+/5 MMT to reduce pain with ambulation. Eval: 4-/5  Pt will demonstrate squat and dead lift with proper form to prepare for return to lifting at a gym.    Eval: NT       PLAN  [x]  Upgrade activities as tolerated     []  Continue plan of care  []  Update interventions per flow sheet       []  Discharge due to:_  []  Other:_      Radha Ramos, CHRIS, CMTPT 9/22/2022  8:52 AM    Future Appointments   Date Time Provider Heike Calvert   9/22/2022 12:00 PM [facility]   9/27/2022  1:30 PM Serge Amado, PTA [facility]   9/30/2022 12:45 PM Serge Amado, PTA [facility]   10/4/2022 12:45 PM Serge Amado, PTA [facility]   10/6/2022 12:00 PM Ling Dillon, PT [facility]   10/11/2022  1:30 PM Yoel Cordero, PT [facility]   10/13/2022  1:45 PM Rc Guajardo [address], PT [facility]

## 2022-09-27 ENCOUNTER — APPOINTMENT (OUTPATIENT)
Dept: PHYSICAL THERAPY | Age: 57
End: 2022-09-27
Payer: OTHER GOVERNMENT

## 2022-09-30 ENCOUNTER — APPOINTMENT (OUTPATIENT)
Dept: PHYSICAL THERAPY | Age: 57
End: 2022-09-30
Payer: OTHER GOVERNMENT

## 2022-10-04 ENCOUNTER — HOSPITAL ENCOUNTER (OUTPATIENT)
Dept: PHYSICAL THERAPY | Age: 57
End: 2022-10-04
Payer: OTHER GOVERNMENT

## 2022-10-06 ENCOUNTER — APPOINTMENT (OUTPATIENT)
Dept: PHYSICAL THERAPY | Age: 57
End: 2022-10-06
Payer: OTHER GOVERNMENT

## 2022-10-11 ENCOUNTER — HOSPITAL ENCOUNTER (OUTPATIENT)
Dept: PHYSICAL THERAPY | Age: 57
Discharge: HOME | End: 2022-10-11
Payer: OTHER GOVERNMENT

## 2022-10-11 PROCEDURE — 97530 THERAPEUTIC ACTIVITIES: CPT

## 2022-10-11 PROCEDURE — 97112 NEUROMUSCULAR REEDUCATION: CPT

## 2022-10-11 PROCEDURE — 97110 THERAPEUTIC EXERCISES: CPT

## 2022-10-11 NOTE — PROGRESS NOTES
PT DAILY TREATMENT NOTE     Patient Name: Jan Pak  BHKQ:  : 1965  [x]  Patient  Verified  Payor: [facility] CCN / Plan: [facility] CCN / Product Type: Federal Funded Programs /    In time:130pm  Out time:206pm  Total Treatment Time (min): 36  Visit #: 3 of 8    Treatment Area: Other low back pain [M54.59]    SUBJECTIVE  Pain Level (0-10 scale): 0  Any medication changes, allergies to medications, adverse drug reactions, diagnosis change, or new procedure performed?: [x] No    [] Yes (see summary sheet for update)  Subjective functional status/changes:   [] No changes reported  Reports his doctor thinks is may be more muscular and to try stretching before PT to see if that improves tolerance to exercise with PT. He experienced elevated pain x 3 days following first PT session and was also quite sore following evaluation. OBJECTIVE     10 min Therapeutic Exercise:  [x] See flow sheet :   Rationale: increase ROM and increase strength to improve the patients ability to manage ADLs. 8 min Therapeutic Activity:  [x]  See flow sheet :dowel hip hinging, barrel gluteal work. Rationale: increase strength and improve coordination  to improve the patients ability to perform housework with reduced pain. 16 min Neuromuscular Re-education:  []  See flow sheet : PPT series, dead bugs   Rationale: increase strength and improve coordination  to improve the patients ability to perform functional activities with improved stability and reduced pain. 2 min Manual Therapy:  soft tissue assessment without any tightness or pain; WNL alignment. The manual therapy interventions were performed at a separate and distinct time from the therapeutic activities interventions. Rationale: decrease pain to manage ADLs.            With   [] TE   [] TA   [] neuro   [] other: Patient Education: [x] Review HEP    [] Progressed/Changed HEP based on:   [] positioning   [] body mechanics [] transfers   [] heat/ice application    [] other:      Other Objective/Functional Measures: exercises regressed per flowsheet     Pain Level (0-10 scale) post treatment: 0    ASSESSMENT/Changes in Function: Therapist held on trapeze and core align activities in lieu of PPT exercises in supine to work on quality core engagement activities. Pt demonstrates good soft tissue extensibility today without pelvic misalignment. He leaves with no pain. He is instructed to perform only the first five activities on his HEP in order to ascertain benefit to pain and plan to reassess response to therapy at [facility]. Patient will continue to benefit from skilled PT services to modify and progress therapeutic interventions, address functional mobility deficits, address ROM deficits, address strength deficits, analyze and address soft tissue restrictions, analyze and cue movement patterns, analyze and modify body mechanics/ergonomics, assess and modify postural abnormalities, address imbalance/dizziness, and instruct in home and community integration to attain remaining goals. []  See Plan of Care  []  See progress note/recertification  []  See Discharge Summary         Progress towards goals / Updated goals:  Short Term Goals: To be accomplished in 2 weeks:  Pt will report daily HEP compliance to optimize therapeutic outcomes. Eval: HEP assigned  Current: has not attempted 9/22/22  Pt will report pain < or = 3/10 to demonstrate improved self management of pain. Eval: 6/10 today   Current: progressed, 4/10 on average, 0/10 after stretching. (10/11/2022)  Long Term Goals: To be accomplished in 4 weeks:  Pt will improve his FOTO to 57, demonstrating improved functional ADL capacity. Eval: 48  Pt will demonstrate WNL pelvic alignment to reduce pain with exercise.   Eval: left posterior innominate   Current: WNL alignment (10/11/2022)  Pt will improve his B gluteus sae strength to 4+/5 MMT to improve hip stability with weight lifting. Eval: 4-/5  Pt will improve his B gluteus medius strength to 4+/5 MMT to reduce pain with ambulation. Eval: 4-/5  Pt will demonstrate squat and dead lift with proper form to prepare for return to lifting at a gym.    Eval: NT     PLAN  []  Upgrade activities as tolerated     [x]  Continue plan of care  []  Update interventions per flow sheet       []  Discharge due to:_  []  Other:_      Kofi Zavaleta, PT 10/11/2022  1:33 PM    Future Appointments   Date Time Provider Heike Calvert   10/13/2022  1:45 PM Neelam Britt, PT [facility]

## 2022-10-13 ENCOUNTER — HOSPITAL ENCOUNTER (OUTPATIENT)
Dept: PHYSICAL THERAPY | Age: 57
Discharge: HOME | End: 2022-10-13
Payer: OTHER GOVERNMENT

## 2022-10-13 PROCEDURE — 97530 THERAPEUTIC ACTIVITIES: CPT

## 2022-10-13 PROCEDURE — 97110 THERAPEUTIC EXERCISES: CPT

## 2022-10-13 PROCEDURE — 97112 NEUROMUSCULAR REEDUCATION: CPT

## 2022-10-13 NOTE — PROGRESS NOTES
PT DAILY TREATMENT NOTE     Patient Name: Gilbert Miller  COGL:  : 1965  [x]  Patient  Verified  Payor: [facility] CCN / Plan: [facility] CCN / Product Type: Federal Funded Programs /    In time:144pm  Out time:226pm  Total Treatment Time (min): 42  Visit #: 4 of 8     Treatment Area: Other low back pain [M54.59]    SUBJECTIVE  Pain Level (0-10 scale): 0  Any medication changes, allergies to medications, adverse drug reactions, diagnosis change, or new procedure performed?: [x] No    [] Yes (see summary sheet for update)  Subjective functional status/changes:   [] No changes reported  Pt was a little sore after the last session but not too bad. Used TENS before coming in and stretched. OBJECTIVE    10 min Therapeutic Exercise:  [x] See flow sheet :   Rationale: increase ROM and increase strength to improve the patients ability to manage ADLs. 8 min Therapeutic Activity:  [x]  See flow sheet :dowel hip hinging, barrel gluteal work. Rationale: increase strength and improve coordination  to improve the patients ability to perform housework with reduced pain. 24 min Neuromuscular Re-education:  [x]  See flow sheet : PPT series, dead bugs, core align exercises   Rationale: increase strength and improve coordination  to improve the patients ability to perform functional activities with improved stability and reduced pain. With   [] TE   [] TA   [] neuro   [] other: Patient Education: [x] Review HEP    [] Progressed/Changed HEP based on:   [] positioning   [] body mechanics   [] transfers   [] heat/ice application    [] other:      Other Objective/Functional Measures: re-initiated core align exercises. Pain Level (0-10 scale) post treatment: 0    ASSESSMENT/Changes in Function: Provided cueing throughout session for abdominal draw ins or pelvic tilts as required for improving lumbopelvic stability. Continued to emphasize not pushing through pain.  Plan to continue to progress quite slowly to ensure good toleration per his pain tolerance. Patient will continue to benefit from skilled PT services to modify and progress therapeutic interventions, address functional mobility deficits, address ROM deficits, address strength deficits, analyze and address soft tissue restrictions, analyze and cue movement patterns, analyze and modify body mechanics/ergonomics, assess and modify postural abnormalities, address imbalance/dizziness, and instruct in home and community integration to attain remaining goals. []  See Plan of Care  []  See progress note/recertification  []  See Discharge Summary         Progress towards goals / Updated goals:  Short Term Goals: To be accomplished in 2 weeks:  Pt will report daily HEP compliance to optimize therapeutic outcomes. Eval: HEP assigned  Current: has not attempted 9/22/22   Pt will report pain < or = 3/10 to demonstrate improved self management of pain. Eval: 6/10 today               Current: progressed, 4/10 on average, 0/10 after stretching. (10/11/2022)  Long Term Goals: To be accomplished in 4 weeks:  Pt will improve his FOTO to 57, demonstrating improved functional ADL capacity. Eval: 48  Pt will demonstrate WNL pelvic alignment to reduce pain with exercise. Eval: left posterior innominate   Current: WNL alignment (10/11/2022)  Pt will improve his B gluteus sae strength to 4+/5 MMT to improve hip stability with weight lifting. Eval: 4-/5  Pt will improve his B gluteus medius strength to 4+/5 MMT to reduce pain with ambulation. Eval: 4-/5  Pt will demonstrate squat and dead lift with proper form to prepare for return to lifting at a gym. Eval: NT      PLAN  []  Upgrade activities as tolerated     [x]  Continue plan of care  []  Update interventions per flow sheet       []  Discharge due to:_  []  Other:_      Nay Winkler, PT 10/13/2022  1:54 PM    No future appointments.

## 2022-10-17 ENCOUNTER — TELEPHONE (OUTPATIENT)
Dept: PHYSICAL THERAPY | Age: 57
End: 2022-10-17

## 2022-10-19 ENCOUNTER — APPOINTMENT (OUTPATIENT)
Dept: PHYSICAL THERAPY | Age: 57
End: 2022-10-19
Payer: OTHER GOVERNMENT

## 2022-10-26 ENCOUNTER — APPOINTMENT (OUTPATIENT)
Dept: PHYSICAL THERAPY | Age: 57
End: 2022-10-26
Payer: OTHER GOVERNMENT

## 2022-10-28 ENCOUNTER — HOSPITAL ENCOUNTER (OUTPATIENT)
Dept: PHYSICAL THERAPY | Age: 57
Discharge: HOME | End: 2022-10-28
Payer: OTHER GOVERNMENT

## 2022-10-28 PROCEDURE — 97112 NEUROMUSCULAR REEDUCATION: CPT

## 2022-10-28 PROCEDURE — 97110 THERAPEUTIC EXERCISES: CPT

## 2022-10-28 PROCEDURE — 97530 THERAPEUTIC ACTIVITIES: CPT

## 2022-10-28 NOTE — PROGRESS NOTES
[facility]  [address] [facility] [address]  [phone] fax    Physical Therapy Progress Note  Patient name: Ebony Benitez Start of Care: 22   Referral source: Andriy Pak* : 1965   Medical/Treatment Diagnosis: Other low back pain [M54.59]  Payor: Gurvinder Wan / Plan: Keren Woodson / Product Type: Federal Funded Programs /  Onset Date:     Prior Hospitalization: see medical history Provider#: [id number]   Medications: Verified on Patient Summary List     Comorbidities: asthma, back pain, BMI > 30, depression, diabetes, HA, high blood pressure, sleep dysfunction, chronic B knee pains   Prior Level of Function: prior to the last year has been managing pain with NSAIDS and oral meds. Visits from Start of Care: 5    Missed Visits: 0  Short Term Goals: To be accomplished in 2 weeks:  Pt will report daily HEP compliance to optimize therapeutic outcomes. Eval: HEP assigned  Current: has not attempted; reports compliance - goal met  Pt will report pain < or = 3/10 to demonstrate improved self management of pain. Eval: 6/10 today               Current: progressed, 4/10 on average, 0/10 after stretching  Long Term Goals: To be accomplished in 4 weeks:  Pt will improve his FOTO to 57, demonstrating improved functional ADL capacity. Eval: 48  Current:76 goal met  Pt will demonstrate WNL pelvic alignment to reduce pain with exercise. Eval: left posterior innominate   Current: WNL alignment   Pt will improve his B gluteus sae strength to 4+/5 MMT to improve hip stability with weight lifting. Eval: 4-/5              Current:right 4+/5; left 4/5  Pt will improve his B gluteus medius strength to 4+/5 MMT to reduce pain with ambulation. Eval: 4-/5  Current:4/5 B  Pt will demonstrate squat and dead lift with proper form to prepare for return to lifting at a gym.    Eval: NT Current:have not initiated yet     Key Functional Changes: FOTO improved by 28 points indicating significant improvement with functional tasks    Updated Goals: to be achieved in 4 weeks:  1. Pt will improve his B gluteus sae strength to 4+/5 MMT to improve hip stability with weight lifting. PN :right 4+/5; left 4/5  2. Pt will improve his B gluteus medius strength to 4+/5 MMT to reduce pain with ambulation. PN:4/5 B  3. Pt will demonstrate squat and dead lift with proper form to prepare for return to lifting at a gym. PN:have not initiated yet   FOTO has improved by 28 points indicating significant change in functional mobility with daily tasks. B hip strength has also improved by 1/2 grade since IE. Patient will benefit from continuing with PT to further progress with strength and stability in B hips and core. ASSESSMENT/RECOMMENDATIONS:  [x]Continue therapy per initial plan/protocol at a frequency of  2 x per week for 4 weeks  []Continue therapy with the following recommended changes:_____________________      _____________________________________________________________________  []Discontinue therapy progressing towards or have reached established goals  []Discontinue therapy due to lack of appreciable progress towards goals  []Discontinue therapy due to lack of attendance or compliance  []Await Physician's recommendations/decisions regarding therapy  []Other:________________________________________________________________    Thank you for this referral.    Alba Schaffer, PT 10/28/2022 8:40 AM  NOTE TO PHYSICIAN:  PLEASE COMPLETE THE ORDERS BELOW AND   FAX TO [facility]: [phone]  If you are unable to process this request in 24 hours please contact our office: [phone]    I have read the above report and request that my patient continue as recommended.   I have read the above report and request that my patient continue therapy with the following changes/special instructions:__________________________________________________________  I have read the above report and request that my patient be discharged from therapy.     Physicians signature: ______________________________Date: ______Time:______     Cassidy Ibarra

## 2022-10-28 NOTE — PROGRESS NOTES
PT DAILY TREATMENT NOTE 10-18    Patient Name: Renetta Guajardo  CPQ  : 1965  [x]  Patient  Verified  Payor: [facility] CCN / Plan: [facility] CCN / Product Type: Federal Funded Programs /    In [employer] time:1204  Total Treatment Time (min): 49  Visit #: 5 of 8        Treatment Area: Other low back pain [M54.59]    SUBJECTIVE  Pain Level (0-10 scale): 1  Any medication changes, allergies to medications, adverse drug reactions, diagnosis change, or new procedure performed?: [x] No    [] Yes (see summary sheet for update)  Subjective functional status/changes:   [] No changes reported  I've been feeling pretty good. OBJECTIVE      10 min Therapeutic Exercise:  [x] See flow sheet :   Rationale: increase ROM and increase strength to improve the patients ability to perform daily activities      31 min Neuromuscular Re-education:  [x]  See flow sheet :   Rationale: increase strength  to improve the patients ability to recruit Fred and glutes    8 min Therapeutic Activity:  [x]  See flow sheet :dowel hip hinging, barrel gluteal work   Rationale: increase strength  to improve the patients ability to perform housework with reduced pain    With   [] TE   [] TA   [] neuro   [] other: Patient Education: [x] Review HEP    [] Progressed/Changed HEP based on:   [] positioning   [] body mechanics   [] transfers   [] heat/ice application    [] other:      Other Objective/Functional Measures: see below     Pain Level (0-10 scale) post treatment: 0    ASSESSMENT/Changes in Function: FOTO has improved by 28 points indicating significant change in functional mobility with daily tasks. B hip strength has also improved by 1/2 grade since IE. Patient will benefit from continuing with PT to further progress with strength and stability in B hips and core.      Patient will continue to benefit from skilled PT services to modify and progress therapeutic interventions, address strength deficits, analyze and address soft tissue restrictions, and analyze and cue movement patterns to attain remaining goals. []  See Plan of Care  [x]  See progress note/recertification  []  See Discharge Summary         Progress towards goals / Updated goals:  Short Term Goals: To be accomplished in 2 weeks:  Pt will report daily HEP compliance to optimize therapeutic outcomes. Eval: HEP assigned  Current: has not attempted; reports compliance - goal met  Pt will report pain < or = 3/10 to demonstrate improved self management of pain. Eval: 6/10 today               Current: progressed, 4/10 on average, 0/10 after stretching  Long Term Goals: To be accomplished in 4 weeks:  Pt will improve his FOTO to 57, demonstrating improved functional ADL capacity. Eval: 48  Current:76 goal met  Pt will demonstrate WNL pelvic alignment to reduce pain with exercise. Eval: left posterior innominate   Current: WNL alignment   Pt will improve his B gluteus sae strength to 4+/5 MMT to improve hip stability with weight lifting. Eval: 4-/5   Current:right 4+/5; left 4/5  Pt will improve his B gluteus medius strength to 4+/5 MMT to reduce pain with ambulation. Eval: 4-/5  Current:4/5 B  Pt will demonstrate squat and dead lift with proper form to prepare for return to lifting at a gym.    Eval: NT    Current:have not initiated yet   PLAN  [x]  Upgrade activities as tolerated     []  Continue plan of care  []  Update interventions per flow sheet       []  Discharge due to:_  []  Other:_      Adonna Apgar, CHRIS, CMTPT 10/28/2022  8:38 AM    Future Appointments   Date Time Provider Heike Calvert   10/28/2022 11:15 AM Blanca Baker B, PT [facility]   10/31/2022 12:00 PM Suraj Neri, PT [facility]   11/3/2022  1:30 PM Suraj Neri, PT [facility]   2022 12:30 PM Richard Delgado, PTA [facility]

## 2022-10-31 ENCOUNTER — HOSPITAL ENCOUNTER (OUTPATIENT)
Dept: PHYSICAL THERAPY | Age: 57
Discharge: HOME | End: 2022-10-31
Payer: OTHER GOVERNMENT

## 2022-10-31 PROCEDURE — 97530 THERAPEUTIC ACTIVITIES: CPT

## 2022-10-31 PROCEDURE — 97112 NEUROMUSCULAR REEDUCATION: CPT

## 2022-10-31 PROCEDURE — 97110 THERAPEUTIC EXERCISES: CPT

## 2022-10-31 NOTE — PROGRESS NOTES
PT DAILY TREATMENT NOTE 10-18    Patient Name: Jose Doherty  HZTA:  : 1965  [x]  Patient  Verified  Payor: [facility] CCN / Plan: [facility] CCN / Product Type: Federal Funded Programs /    In [employer] time:1241  Total Treatment Time (min): 42  Visit #: 1 of 8    Treatment Area: Other low back pain [M54.59]    SUBJECTIVE  Pain Level (0-10 scale): 0  Any medication changes, allergies to medications, adverse drug reactions, diagnosis change, or new procedure performed?: [x] No    [] Yes (see summary sheet for update)  Subjective functional status/changes:   [x] No changes reported      OBJECTIVE    11 min Therapeutic Exercise:  [x] See flow sheet :   Rationale: increase ROM and increase strength to improve the patients ability to perform daily activities     23 min Neuromuscular Re-education:  [x]  See flow sheet :   Rationale: increase strength  to improve the patients ability to recruit TA and glutes for daily activities     8 min Therapeutic Activity:  [x]  See flow sheet :   Rationale: increase strength  to improve the patients ability to perform ADLs    With   [] TE   [] TA   [] neuro   [] other: Patient Education: [x] Review HEP    [] Progressed/Changed HEP based on:   [] positioning   [] body mechanics   [] transfers   [] heat/ice application    [] other:      Other Objective/Functional Measures:      Pain Level (0-10 scale) post treatment: 0    ASSESSMENT/Changes in Function: Added alternating marches with Tri and KB squats to continue to strengthen core and hips for daily activities. Patient will continue to benefit from skilled PT services to modify and progress therapeutic interventions, address functional mobility deficits, address strength deficits, and analyze and cue movement patterns to attain remaining goals. []  See Plan of Care  []  See progress note/recertification  []  See Discharge Summary         Progress towards goals / Updated goals:  1. Pt will improve his B gluteus sae strength to 4+/5 MMT to improve hip stability with weight lifting. PN :right 4+/5; left 4/5  2. Pt will improve his B gluteus medius strength to 4+/5 MMT to reduce pain with ambulation. PN:4/5 B  3. Pt will demonstrate squat and dead lift with proper form to prepare for return to lifting at a gym.                 PN:have not initiated yet    Current:able to perform 25#KB squat without back pain 10/31/22  PLAN  [x]  Upgrade activities as tolerated     []  Continue plan of care  []  Update interventions per flow sheet       []  Discharge due to:_  []  Other:_      Adonna Apgar, CHRIS, CMTPT 10/31/2022  9:24 AM    Future Appointments   Date Time Provider Heike Calvert   10/31/2022 12:00 PM [facility]   11/3/2022  1:30 PM Suraj Neri, PT [facility]   2022 12:30 PM Richard Delgado [facility]

## 2022-11-02 ENCOUNTER — APPOINTMENT (OUTPATIENT)
Dept: PHYSICAL THERAPY | Age: 57
End: 2022-11-02
Payer: OTHER GOVERNMENT

## 2022-11-03 ENCOUNTER — HOSPITAL ENCOUNTER (OUTPATIENT)
Dept: PHYSICAL THERAPY | Age: 57
Discharge: HOME | End: 2022-11-03
Payer: OTHER GOVERNMENT

## 2022-11-03 PROCEDURE — 97112 NEUROMUSCULAR REEDUCATION: CPT

## 2022-11-03 PROCEDURE — 97110 THERAPEUTIC EXERCISES: CPT

## 2022-11-03 PROCEDURE — 97530 THERAPEUTIC ACTIVITIES: CPT

## 2022-11-03 NOTE — PROGRESS NOTES
PT DAILY TREATMENT NOTE 10-18    Patient Name: Thompson Mijares  Date:11/3/2022  : 1965  [x]  Patient  Verified  Payor: [facility] CCN / [facility] / Product Type: Federal Funded Programs /    In [employer] time:211  Total Treatment Time (min): 42  Visit #: 2 of 8    Treatment Area: Other low back pain [M54.59]    SUBJECTIVE  Pain Level (0-10 scale): 0  Any medication changes, allergies to medications, adverse drug reactions, diagnosis change, or new procedure performed?: [x] No    [] Yes (see summary sheet for update)  Subjective functional status/changes:   [x] No changes reported      OBJECTIVE    10 min Therapeutic Exercise:  [x] See flow sheet :   Rationale: increase ROM and increase strength to improve the patients ability to perform daily activities     24 min Neuromuscular Re-education:  [x]  See flow sheet :   Rationale: increase strength, improve coordination, improve balance, and increase proprioception  to improve the patients ability to recruit TA and glutes for daily activities     8 min Therapeutic Activity:  [x]  See flow sheet :   Rationale: increase strength  to improve the patients ability to perform ADLs without pain    With   [] TE   [] TA   [] neuro   [] other: Patient Education: [x] Review HEP    [] Progressed/Changed HEP based on:   [] positioning   [] body mechanics   [] transfers   [] heat/ice application    [] other:      Other Objective/Functional Measures:      Pain Level (0-10 scale) post treatment: 0    ASSESSMENT/Changes in Function: Added bandwalks x3 today to continue to imporve B hip strength. Able to perform all exercises without back pain and with good form. Patient will continue to benefit from skilled PT services to modify and progress therapeutic interventions, address strength deficits, and analyze and cue movement patterns to attain remaining goals.      []  See Plan of Care  []  See progress note/recertification  []  See Discharge Summary         Progress towards goals / Updated goals:  1. Pt will improve his B gluteus sae strength to 4+/5 MMT to improve hip stability with weight lifting. PN :right 4+/5; left 4/5  2. Pt will improve his B gluteus medius strength to 4+/5 MMT to reduce pain with ambulation. PN:4/5 B  3. Pt will demonstrate squat and dead lift with proper form to prepare for return to lifting at a gym.                 PN:have not initiated yet               Current:able to perform 25#KB squat without back pain 10/31/22    PLAN  [x]  Upgrade activities as tolerated     []  Continue plan of care  []  Update interventions per flow sheet       []  Discharge due to:_  []  Other:_      Zackery Waggoner, MPT, CMTPT 11/3/2022  8:45 AM    Future Appointments   Date Time Provider Heike Calvert   11/3/2022  1:30 PM Celester Sick, PT [facility]   11/7/2022 12:30 PM Mac Baker, PTA [facility]   11/14/2022  2:15 PM Jenna Lucio, PTA [facility]   11/17/2022 12:45 PM Lashay Madison, PT [facility]   11/22/2022 12:00 PM Celester Sick, PT [facility]   11/28/2022 12:00 PM Celester Sick, PT [facility]   12/1/2022 11:15 AM Celester Sick, PT [facility]

## 2022-11-07 ENCOUNTER — HOSPITAL ENCOUNTER (OUTPATIENT)
Dept: PHYSICAL THERAPY | Age: 57
Discharge: HOME | End: 2022-11-07
Payer: OTHER GOVERNMENT

## 2022-11-07 PROCEDURE — 97112 NEUROMUSCULAR REEDUCATION: CPT

## 2022-11-07 PROCEDURE — 97530 THERAPEUTIC ACTIVITIES: CPT

## 2022-11-07 PROCEDURE — 97110 THERAPEUTIC EXERCISES: CPT

## 2022-11-07 NOTE — PROGRESS NOTES
PT DAILY TREATMENT NOTE     Patient Name: Carmita Dillon  Date:2022  : 1965  [x]  Patient  Verified  Payor: [facility] CCN / [facility] / Product Type: Federal Funded Programs /    In time:12:30  Out time:1:09  Total Treatment Time (min): 39  Visit #: 3 of 8    Medicare/BCBS Only   Total Timed Codes (min):  39 1:1 Treatment Time:  39       Treatment Area: Other low back pain [M54.59]    SUBJECTIVE  Pain Level (0-10 scale): 0/10  Any medication changes, allergies to medications, adverse drug reactions, diagnosis change, or new procedure performed?: [x] No    [] Yes (see summary sheet for update)  Subjective functional status/changes:   [] No changes reported  Pt denies pain currently. Pt reports his back felt tight over the weekend but overall he is feeling good. OBJECTIVE    19 min Therapeutic Exercise:  [x] See flow sheet :   Rationale: increase ROM and increase strength to improve the patients ability to perform ADLs with increased ease. 10 min Therapeutic Activity:  [x]  See flow sheet :   Rationale: increase strength, improve coordination, and increase proprioception  to improve the patients ability to perform ADLs with increased ease. 10 min Neuromuscular Re-education:  [x]  See flow sheet :   Rationale: increase ROM and increase strength  to improve the patients ability to perform ADLs with increased ease. With   [] TE   [] TA   [] neuro   [] other: Patient Education: [x] Review HEP    [] Progressed/Changed HEP based on:   [] positioning   [] body mechanics   [] transfers   [] heat/ice application    [] other:      Other Objective/Functional Measures:      Pain Level (0-10 scale) post treatment: 0/10    ASSESSMENT/Changes in Function: Pt demonstrates good ability to engage core with all advanced strengthening exercises.      Patient will continue to benefit from skilled PT services to modify and progress therapeutic interventions, address functional mobility deficits, address ROM deficits, address strength deficits, analyze and address soft tissue restrictions, analyze and cue movement patterns, analyze and modify body mechanics/ergonomics, and assess and modify postural abnormalities to attain remaining goals. []  See Plan of Care  []  See progress note/recertification  []  See Discharge Summary         Progress towards goals / Updated goals:  1. Pt will improve his B gluteus sae strength to 4+/5 MMT to improve hip stability with weight lifting. PN :right 4+/5; left 4/5  2. Pt will improve his B gluteus medius strength to 4+/5 MMT to reduce pain with ambulation. PN:4/5 B  3. Pt will demonstrate squat and dead lift with proper form to prepare for return to lifting at a gym.                 PN:have not initiated yet               Current:able to perform 25#KB squat without back pain 10/31/22    PLAN  []  Upgrade activities as tolerated     [x]  Continue plan of care  []  Update interventions per flow sheet       []  Discharge due to:_  []  Other:_      Ellison Fothergill, PTA 11/7/2022  12:40 PM    Future Appointments   Date Time Provider Heike Calvert   11/14/2022  2:15 PM Jimi Correa, PTA [facility]   11/17/2022 12:45 PM Chari Ennis, PT [facility]   11/22/2022 12:00 PM [facility]   11/28/2022 12:00 PM Misael Valdez, [facility]   12/1/2022 11:15 AM Misael Valdez, [facility]

## 2022-11-17 ENCOUNTER — HOSPITAL ENCOUNTER (OUTPATIENT)
Dept: PHYSICAL THERAPY | Age: 57
Discharge: HOME | End: 2022-11-17
Payer: OTHER GOVERNMENT

## 2022-11-17 PROCEDURE — 97530 THERAPEUTIC ACTIVITIES: CPT

## 2022-11-17 PROCEDURE — 97112 NEUROMUSCULAR REEDUCATION: CPT

## 2022-11-17 PROCEDURE — 97110 THERAPEUTIC EXERCISES: CPT

## 2022-11-17 NOTE — PROGRESS NOTES
PT DAILY TREATMENT NOTE     Patient Name: Anita Flannery  UOAK:  : 1965  [x]  Patient  Verified  Payor: [facility] / Plan: [facility] CCN / Product Type: Federal Funded Programs /    In time:12:40 Out time:1:25  Total Treatment Time (min): 45  Visit #: 4 of 8    Treatment Area: Other low back pain [M54.59]    SUBJECTIVE  Pain Level (0-10 scale): 0/10  Any medication changes, allergies to medications, adverse drug reactions, diagnosis change, or new procedure performed?: [x] No    [] Yes (see summary sheet for update)  Subjective functional status/changes:   [] No changes reported  The patient reports that his back is more stiff than in any real pain up arrival.     OBJECTIVE  18 min Therapeutic Exercise:  [x] See flow sheet :   Rationale: increase ROM and increase strength to improve the patients ability to improve ADL ease. 12 min Therapeutic Activity:  [x]  See flow sheet : lifting for improved chore ease. Rationale: increase ROM and increase strength  to improve the patients ability to improve ADL ease. 15 min Neuromuscular Re-education:  [x]  See flow sheet :   Rationale: increase ROM and increase strength  to improve the patients ability to improve ADL ease. With   [] TE   [] TA   [] neuro   [] other: Patient Education: [x] Review HEP    [] Progressed/Changed HEP based on:   [] positioning   [] body mechanics   [] transfers   [] heat/ice application    [] other:      Other Objective/Functional Measures:   Glute med: 4+/5 MMT right, 4/5 MMT left  Glute max: 4+/5  MMT right, 4/5 MMT left    Pain Level (0-10 scale) post treatment: 0/10    ASSESSMENT/Changes in Function: The patient is making good progress concerning hip strength. He was able to perform 25# KB squat lifts without cues and maintained good form throughout. He does fatigue quickly throughout session during band walks and hip x 3.  His hip ABD strength has improved through right hip, though his left hip remains slightly weaker than his right. He left I no pain post session. Encouraged the patient to begin lifting weights in the gym and carrying over what is being done in PT in order to ease transition upon D/C, the patient agreed to begin. Patient will continue to benefit from skilled PT services to modify and progress therapeutic interventions, address functional mobility deficits, address ROM deficits, address strength deficits, analyze and address soft tissue restrictions, analyze and cue movement patterns, analyze and modify body mechanics/ergonomics, assess and modify postural abnormalities, and instruct in home and community integration to attain remaining goals. []  See Plan of Care  []  See progress note/recertification  []  See Discharge Summary         Progress towards goals / Updated goals:  1. Pt will improve his B gluteus sae strength to 4+/5 MMT to improve hip stability with weight lifting. PN :right 4+/5; left 4/5   Current: No change since PN: 4+/5 MMT right; 4/5 left 11/17/2022  2. Pt will improve his B gluteus medius strength to 4+/5 MMT to reduce pain with ambulation. PN:4/5 B  Current: Progressing with right hip ABD strength 4+/5 MMT right, 4/5 MMT left 11/17/2022  3. Pt will demonstrate squat and dead lift with proper form to prepare for return to lifting at a gym.                 PN:have not initiated yet               Current:able to perform 25#KB squat without back pain 10/31/22     PLAN  [x]  Upgrade activities as tolerated     []  Continue plan of care  []  Update interventions per flow sheet       []  Discharge due to:_  []  Other:_      Merissa Jiang, PT 11/17/2022  12:44 PM    Future Appointments   Date Time Provider Heike Calvert   11/17/2022 12:45 PM Quincy Yeung, PT [facility]   11/22/2022 12:00 PM [facility]   11/28/2022 12:00 PM [facility]   12/1/2022 11:15 AM Ryann Tolbert, PT [id number]

## 2022-11-22 ENCOUNTER — TELEPHONE (OUTPATIENT)
Dept: PHYSICAL THERAPY | Age: 57
End: 2022-11-22

## 2022-11-28 ENCOUNTER — APPOINTMENT (OUTPATIENT)
Dept: PHYSICAL THERAPY | Age: 57
End: 2022-11-28
Payer: OTHER GOVERNMENT

## 2022-11-28 NOTE — PROGRESS NOTES
[facility]  [address] [facility] [address]  [place], [address]  [phone] fax    Physical Therapy Discharge Summary  Patient name: Theresa Jarrett Start of Care: 22   Referral source: Galdys Larose* : 1965   Medical/Treatment Diagnosis: Other low back pain [M54.59]  Payor: Christal President / Plan: Manav Galvez / Product Type: Federal Funded Programs /  Onset Date:     Prior Hospitalization: see medical history Provider#: [id number]   Medications: Verified on Patient Summary List     Comorbidities: asthma, back pain, BMI > 30, depression, diabetes, HA, high blood pressure, sleep dysfunction, chronic B knee pains   Prior Level of Function: prior to the last year has been managing pain with NSAIDS and oral meds. Visits from Start of Care: 10    Missed Visits: 3  Reporting Period : 10/28/22 to 22      Summary of Care:  1. Pt will improve his B gluteus sae strength to 4+/5 MMT to improve hip stability with weight lifting. PN :right 4+/5; left 4/5              Current: No change since PN: 4+/5 MMT right; 4/5 left   2. Pt will improve his B gluteus medius strength to 4+/5 MMT to reduce pain with ambulation. PN:4/5 B  Current: Progressing with right hip ABD strength 4+/5 MMT right, 4/5 MMT left   3. Pt will demonstrate squat and dead lift with proper form to prepare for return to lifting at a gym. PN:have not initiated yet               Current:able to perform 25#KB squat without back pain- goal met   Patient has to be out of town for work for the next couple months; D/C at this time.       ASSESSMENT/RECOMMENDATIONS:  [x]Discontinue therapy: []Patient has reached or is progressing toward set goals      [x]Patient is non-compliant or has abdicated      []Due to lack of appreciable progress towards set Ul. Bryant Lanier, PT 2022 8:59 AM

## 2022-12-01 ENCOUNTER — APPOINTMENT (OUTPATIENT)
Dept: PHYSICAL THERAPY | Age: 57
End: 2022-12-01